# Patient Record
Sex: FEMALE | Race: WHITE | NOT HISPANIC OR LATINO
[De-identification: names, ages, dates, MRNs, and addresses within clinical notes are randomized per-mention and may not be internally consistent; named-entity substitution may affect disease eponyms.]

---

## 2021-01-06 PROBLEM — Z00.00 ENCOUNTER FOR PREVENTIVE HEALTH EXAMINATION: Status: ACTIVE | Noted: 2021-01-06

## 2021-01-07 DIAGNOSIS — C50.912 MALIGNANT NEOPLASM OF UNSPECIFIED SITE OF LEFT FEMALE BREAST: ICD-10-CM

## 2021-01-07 DIAGNOSIS — Z78.9 OTHER SPECIFIED HEALTH STATUS: ICD-10-CM

## 2021-01-07 DIAGNOSIS — N63.0 UNSPECIFIED LUMP IN UNSPECIFIED BREAST: ICD-10-CM

## 2021-01-07 DIAGNOSIS — E78.00 PURE HYPERCHOLESTEROLEMIA, UNSPECIFIED: ICD-10-CM

## 2021-01-07 DIAGNOSIS — I10 ESSENTIAL (PRIMARY) HYPERTENSION: ICD-10-CM

## 2021-01-07 DIAGNOSIS — Z85.41 PERSONAL HISTORY OF MALIGNANT NEOPLASM OF CERVIX UTERI: ICD-10-CM

## 2021-01-07 RX ORDER — IRBESARTAN 300 MG/1
300 TABLET ORAL
Refills: 0 | Status: ACTIVE | COMMUNITY

## 2021-01-07 RX ORDER — METOPROLOL SUCCINATE 100 MG/1
100 TABLET, EXTENDED RELEASE ORAL
Refills: 0 | Status: ACTIVE | COMMUNITY

## 2021-01-07 RX ORDER — AMLODIPINE BESYLATE 5 MG/1
5 TABLET ORAL
Refills: 0 | Status: ACTIVE | COMMUNITY

## 2021-01-07 RX ORDER — ATORVASTATIN CALCIUM 10 MG/1
10 TABLET, FILM COATED ORAL
Refills: 0 | Status: ACTIVE | COMMUNITY

## 2021-01-12 ENCOUNTER — APPOINTMENT (OUTPATIENT)
Dept: BREAST CENTER | Facility: CLINIC | Age: 86
End: 2021-01-12
Payer: MEDICARE

## 2021-01-12 VITALS
DIASTOLIC BLOOD PRESSURE: 73 MMHG | WEIGHT: 160 LBS | HEIGHT: 63 IN | BODY MASS INDEX: 28.35 KG/M2 | SYSTOLIC BLOOD PRESSURE: 182 MMHG

## 2021-01-12 DIAGNOSIS — Z90.12 ACQUIRED ABSENCE OF LEFT BREAST AND NIPPLE: ICD-10-CM

## 2021-01-12 DIAGNOSIS — Z85.3 PERSONAL HISTORY OF MALIGNANT NEOPLASM OF BREAST: ICD-10-CM

## 2021-01-12 PROCEDURE — 99213 OFFICE O/P EST LOW 20 MIN: CPT

## 2021-01-12 PROCEDURE — 99072 ADDL SUPL MATRL&STAF TM PHE: CPT

## 2021-01-12 RX ORDER — ASPIRIN 81 MG/1
81 TABLET ORAL
Refills: 0 | Status: ACTIVE | COMMUNITY
Start: 2021-01-12

## 2021-01-12 NOTE — HISTORY OF PRESENT ILLNESS
[FreeTextEntry1] : The patient is a 89-year-old G3, P3 postmenopausal white female of [place] descent. She underwent menarche at age 12 and had her first child at age 24. She has a history of a previous right breast biopsy in the past which was benign. She has a strong family history with two sisters who had breast cancer at age 40 and age 70. Her father had lung cancer at age 65. The patient felt a mass in the left breast towards the outer central region and underwent the mammography on February 12, 2016 showing a highly suspicious 3.4 cm mass in the outer central region of the left breast corresponding to the palpable density. 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 HER-2 was equivocal by IHC as well as FISH with a ratio of 1.6 but total count of 4.6. She underwent an MRI in March 14, 2016 showing a localized cancer in the left breast measuring 3.3 cm on MRI. She was seen by Dr. Dunn in consultation for possible hormonal therapy but given the pleomorphic subtype with the equivocal HER-2 report the patient was advised on undergoing surgery and underwent a left breast mastectomy and sentinel lymph node biopsy on April 25, 2016 which showed a 4.5 cm infiltrating pleomorphic lobular cancer with free margins and 4 - sentinel lymph nodes on H&E as well as immunohistochemistry. The final pathology showed the tumor to be HER-2/kaye negative by IHC. She did undergo [facility] genetic panel testing in May 2016 which was negative. She was referred back to Dr. Dunn and was started on Arimidex on May 13, 2016. She comes in for yearly follow-up and gets yearly right breast mammography.

## 2021-01-12 NOTE — REASON FOR VISIT
[Follow-Up: _____] : a [unfilled] follow-up visit [Family Member] : family member [FreeTextEntry1] : Patient comes in for routine follow-up with a history of a localized central left breast cancer for which she underwent a left breast total mastectomy without reconstruction and sentinel lymph node biopsy on 2016-04-25 for 4.5 cm pleomorphic lobular cancer with 4 - sentinel lymph nodes which was ER/[place] strongly positive and HER-2/kaye negative.

## 2021-01-12 NOTE — PHYSICAL EXAM
[Normocephalic] : normocephalic [EOMI] : extra ocular movement intact [Supple] : supple [No Supraclavicular Adenopathy] : no supraclavicular adenopathy [No Cervical Adenopathy] : no cervical adenopathy [Normal Sinus Rhythm] : normal sinus rhythm [Examined in the supine and seated position] : examined in the supine and seated position [No dominant masses] : no dominant masses in right breast  [Breast Nipple Inversion Right] : nipple not inverted [Breast Nipple Retraction Right] : nipple not retracted [Breast Nipple Flattening Right] : nipple not flattened [Breast Nipple Fissures Right] : nipple not fissured [Breast Mass Right Breast ___cm] : no masses [No Axillary Lymphadenopathy] : no left axillary lymphadenopathy [Soft] : abdomen soft [Normal Bowel Sounds] : normal bowel sounds  [de-identified] : The patient has the obvious left breast mastectomy. She has a ptotic C-cup breast on the right side. She has no evidence of recurrence over the left chest wall and the right breast is negative. She has no axillary, supraclavicular, or cervical adenopathy. [de-identified] : Status postmastectomy without reconstruction

## 2021-01-12 NOTE — ASSESSMENT
[FreeTextEntry1] : The patient is a 89-year-old G3, P3 postmenopausal white female of [place] descent. She underwent menarche at age 12 and had her first child at age 24. She has a history of a previous right breast biopsy in the past which was benign. She has a strong family history with two sisters who had breast cancer at age 40 and age 70. Her father had lung cancer at age 65. The patient felt a mass in the left breast towards the outer central region and underwent the mammography on February 12, 2016 showing a highly suspicious 3.4 cm mass in the outer central region of the left breast corresponding to the palpable density. She underwent an ultrasound-guided core biopsy on March 3, 2016 showing a pleomorphic infiltrating lobular cancer which was ER/MI strongly positive with a Ki-67 of 16%. HER-2 was equivocal by IHC as well as FISH with a ratio of 1.6 but total count of 4.6. She underwent an MRI in March 14, 2016 showing a localized cancer in the left breast measuring 3.3 cm on MRI. She was seen by Dr. Dunn in consultation for possible hormonal therapy but given the pleomorphic subtype with the equivocal HER-2 report the patient was advised on undergoing surgery and underwent a left breast mastectomy and sentinel lymph node biopsy on April 25, 2016 which showed a 4.5 cm infiltrating pleomorphic lobular cancer with free margins and 4 - sentinel lymph nodes on H&E as well as immunohistochemistry. The final pathology showed the tumor to be HER-2/kaye negative by IHC. She did undergo [facility] genetic panel testing in May 2016 which was negative. She was referred back to Dr. Dunn and was started on Arimidex on May 13, 2016. On exam today she has no evidence of recurrence along the left chest wall and her right breast is negative. She underwent a recent right breast mammography on July 24, 2020 which showed no suspicious findings. The patient was reassured and can just follow-up on a yearly basis. She was given a prescription for another right breast mammography but I think she can stop routine mammography at this age.

## 2021-06-03 RX ORDER — EXEMESTANE 25 MG/1
25 TABLET, FILM COATED ORAL
Qty: 90 | Refills: 3 | Status: ACTIVE | COMMUNITY
Start: 2021-06-03 | End: 1900-01-01

## 2023-03-24 ENCOUNTER — RX RENEWAL (OUTPATIENT)
Age: 88
End: 2023-03-24

## 2023-03-24 RX ORDER — EXEMESTANE 25 MG/1
25 TABLET, FILM COATED ORAL
Qty: 30 | Refills: 0 | Status: ACTIVE | COMMUNITY
Start: 2023-03-24 | End: 1900-01-01

## 2023-03-27 ENCOUNTER — RX RENEWAL (OUTPATIENT)
Age: 88
End: 2023-03-27

## 2023-04-03 ENCOUNTER — NON-APPOINTMENT (OUTPATIENT)
Age: 88
End: 2023-04-03

## 2023-04-03 NOTE — ASSESSMENT
[FreeTextEntry1] : The patient is a 89-year-old G3, P3 postmenopausal white female of [place] descent. She underwent menarche at age 12 and had her first child at age 24. She has a history of a previous right breast biopsy in the past which was benign. She has a strong family history with two sisters who had breast cancer at age 40 and age 70. Her father had lung cancer at age 65. The patient felt a mass in the left breast towards the outer central region and underwent the mammography on February 12, 2016 showing a highly suspicious 3.4 cm mass in the outer central region of the left breast corresponding to the palpable density. She underwent an ultrasound-guided core biopsy on March 3, 2016 showing a pleomorphic infiltrating lobular cancer which was ER/MS strongly positive with a Ki-67 of 16%. HER-2 was equivocal by IHC as well as FISH with a ratio of 1.6 but total count of 4.6. She underwent an MRI in March 14, 2016 showing a localized cancer in the left breast measuring 3.3 cm on MRI. She was seen by Dr. Dunn in consultation for possible hormonal therapy but given the pleomorphic subtype with the equivocal HER-2 report the patient was advised on undergoing surgery and underwent a left breast mastectomy and sentinel lymph node biopsy on April 25, 2016 which showed a 4.5 cm infiltrating pleomorphic lobular cancer with free margins and 4 negative sentinel lymph nodes on H&E as well as immunohistochemistry. The final pathology showed the tumor to be HER-2/kaye negative by IHC. She did undergo [facility] genetic panel testing in May 2016 which was negative. She was referred back to Dr. Dunn and was started on Arimidex on May 13, 2016. On exam today she has no evidence of recurrence along the left chest wall and her right breast is negative. She underwent her last right breast mammography on ??????? July 24, 2020 which showed no suspicious findings. The patient was reassured and can just follow-up on a yearly basis. She was given a prescription for another right breast mammography ???????? but I think she can stop routine mammography at this age.  She remains on Arimidex and now follows up with ?????????.

## 2023-04-03 NOTE — HISTORY OF PRESENT ILLNESS
[FreeTextEntry1] : The patient is a 89-year-old G3, P3 postmenopausal white female of Bengali descent. She underwent menarche at age 12 and had her first child at age 24. She has a history of a previous right breast biopsy in the past which was benign. She has a strong family history with two sisters who had breast cancer at age 40 and age 70. Her father had lung cancer at age 65. The patient felt a mass in the left breast towards the outer central region and underwent the mammography on February 12, 2016 showing a highly suspicious 3.4 cm mass in the outer central region of the left breast corresponding to the palpable density. She underwent an ultrasound-guided core biopsy on March 3, 2016 showing a pleomorphic infiltrating lobular cancer which was ER/NE strongly positive with a Ki-67 of 16%. HER-2 was equivocal by IHC as well as FISH with a ratio of 1.6 but total count of 4.6. She underwent an MRI in March 14, 2016 showing a localized cancer in the left breast measuring 3.3 cm on MRI. She was seen by Dr. Dunn in consultation for possible hormonal therapy but given the pleomorphic subtype with the equivocal HER-2 report the patient was advised on undergoing surgery and underwent a left breast mastectomy and sentinel lymph node biopsy on April 25, 2016 which showed a 4.5 cm infiltrating pleomorphic lobular cancer with free margins and 4 - sentinel lymph nodes on H&E as well as immunohistochemistry. The final pathology showed the tumor to be HER-2/kaye negative by IHC. She did undergo [facility] genetic panel testing in May 2016 which was negative. She was referred back to Dr. Dunn and was started on Arimidex on May 13, 2016. She comes in for yearly follow-up and gets yearly right breast mammography.

## 2023-04-03 NOTE — REASON FOR VISIT
[Follow-Up: _____] : a [unfilled] follow-up visit [Family Member] : family member [FreeTextEntry1] : Patient comes in for routine follow-up with a history of a localized central left breast cancer for which she underwent a left breast total mastectomy without reconstruction and sentinel lymph node biopsy on 2016-04-25 for 4.5 cm pleomorphic lobular cancer with 4 - sentinel lymph nodes which was ER/KS strongly positive and HER-2/kaye negative.

## 2023-04-03 NOTE — PHYSICAL EXAM
[Normocephalic] : normocephalic [EOMI] : extra ocular movement intact [Supple] : supple [No Supraclavicular Adenopathy] : no supraclavicular adenopathy [No Cervical Adenopathy] : no cervical adenopathy [Normal Sinus Rhythm] : normal sinus rhythm [Examined in the supine and seated position] : examined in the supine and seated position [No dominant masses] : no dominant masses in right breast  [Breast Mass Right Breast ___cm] : no masses [No Axillary Lymphadenopathy] : no left axillary lymphadenopathy [Soft] : abdomen soft [Normal Bowel Sounds] : normal bowel sounds  [Breast Nipple Inversion Right] : nipple not inverted [Breast Nipple Retraction Right] : nipple not retracted [Breast Nipple Flattening Right] : nipple not flattened [Breast Nipple Fissures Right] : nipple not fissured [de-identified] : The patient has the obvious left breast mastectomy. She has a ptotic C-cup breast on the right side. She has no evidence of recurrence over the left chest wall and the right breast is negative. She has no axillary, supraclavicular, or cervical adenopathy. [de-identified] : Status postmastectomy without reconstruction

## 2023-04-11 ENCOUNTER — APPOINTMENT (OUTPATIENT)
Dept: BREAST CENTER | Facility: CLINIC | Age: 88
End: 2023-04-11
Payer: MEDICARE

## 2023-04-11 VITALS — HEIGHT: 63 IN | BODY MASS INDEX: 28.35 KG/M2 | WEIGHT: 160 LBS

## 2023-04-11 DIAGNOSIS — Z85.3 PERSONAL HISTORY OF MALIGNANT NEOPLASM OF BREAST: ICD-10-CM

## 2023-04-11 DIAGNOSIS — Z80.3 FAMILY HISTORY OF MALIGNANT NEOPLASM OF BREAST: ICD-10-CM

## 2023-04-11 DIAGNOSIS — Z90.12 ACQUIRED ABSENCE OF LEFT BREAST AND NIPPLE: ICD-10-CM

## 2023-04-11 PROCEDURE — 99213 OFFICE O/P EST LOW 20 MIN: CPT

## 2023-04-11 RX ORDER — ANASTROZOLE 1 MG/1
1 TABLET ORAL DAILY
Refills: 0 | Status: DISCONTINUED | COMMUNITY
End: 2023-04-11

## 2023-04-11 RX ORDER — EXEMESTANE 25 MG/1
25 TABLET, FILM COATED ORAL DAILY
Qty: 90 | Refills: 0 | Status: ACTIVE | COMMUNITY
Start: 2023-04-11 | End: 1900-01-01

## 2023-04-11 NOTE — ASSESSMENT
[FreeTextEntry1] : The patient is a 89-year-old G3, P3 postmenopausal white female of [place] descent. She underwent menarche at age 12 and had her first child at age 24. She has a history of a previous right breast biopsy in the past which was benign. She has a strong family history with two sisters who had breast cancer at age 40 and age 70. Her father had lung cancer at age 65. The patient felt a mass in the left breast towards the outer central region and underwent the mammography on February 12, 2016 showing a highly suspicious 3.4 cm mass in the outer central region of the left breast corresponding to the palpable density. She underwent an ultrasound-guided core biopsy on March 3, 2016 showing a pleomorphic infiltrating lobular cancer which was ER/RI strongly positive with a Ki-67 of 16%. HER-2 was equivocal by IHC as well as FISH with a ratio of 1.6 but total count of 4.6. She underwent an MRI in March 14, 2016 showing a localized cancer in the left breast measuring 3.3 cm on MRI. She was seen by Dr. Dunn in consultation for possible hormonal therapy but given the pleomorphic subtype with the equivocal HER-2 report the patient was advised on undergoing surgery and underwent a left breast mastectomy and sentinel lymph node biopsy on April 25, 2016 which showed a 4.5 cm infiltrating pleomorphic lobular cancer with free margins and 4 negative sentinel lymph nodes on H&E as well as immunohistochemistry. The final pathology showed the tumor to be HER-2/kaye negative by IHC. She did undergo [facility] genetic panel testing in May 2016 which was negative. She was referred back to Dr. Dunn and was started on Arimidex on May 13, 2016 but is now on Aromasin. On exam today she has no evidence of recurrence along the left chest wall and her right breast is negative. She underwent her last right breast mammography on July 24, 2020 which showed no suspicious findings. The patient was reassured and can just follow-up on a yearly basis.  She has stopped mammography given her age.  She remains on Aromasin and we sent in another prescription for her today.  She was also given a prescription for postmastectomy bra.

## 2023-04-11 NOTE — HISTORY OF PRESENT ILLNESS
[FreeTextEntry1] : The patient is a 89-year-old G3, P3 postmenopausal white female of [place] descent. She underwent menarche at age 12 and had her first child at age 24. She has a history of a previous right breast biopsy in the past which was benign. She has a strong family history with two sisters who had breast cancer at age 40 and age 70. Her father had lung cancer at age 65. The patient felt a mass in the left breast towards the outer central region and underwent the mammography on February 12, 2016 showing a highly suspicious 3.4 cm mass in the outer central region of the left breast corresponding to the palpable density. She underwent an ultrasound-guided core biopsy on March 3, 2016 showing a pleomorphic infiltrating lobular cancer which was ER/UT strongly positive with a Ki-67 of 16%. HER-2 was equivocal by IHC as well as FISH with a ratio of 1.6 but total count of 4.6. She underwent an MRI in March 14, 2016 showing a localized cancer in the left breast measuring 3.3 cm on MRI. She was seen by Dr. Dunn in consultation for possible hormonal therapy but given the pleomorphic subtype with the equivocal HER-2 report the patient was advised on undergoing surgery and underwent a left breast mastectomy and sentinel lymph node biopsy on April 25, 2016 which showed a 4.5 cm infiltrating pleomorphic lobular cancer with free margins and 4 - sentinel lymph nodes on H&E as well as immunohistochemistry. The final pathology showed the tumor to be HER-2/kaye negative by IHC. She did undergo [facility] genetic panel testing in May 2016 which was negative. She was referred back to Dr. Dunn and was started on Arimidex on May 13, 2016 but later switched to Aromasin. She comes in for yearly follow-up and has stopped mammography given her age.

## 2023-04-11 NOTE — REASON FOR VISIT
[Follow-Up: _____] : a [unfilled] follow-up visit [Family Member] : family member [FreeTextEntry1] : Patient comes in for routine follow-up with a history of a localized central left breast cancer for which she underwent a left breast total mastectomy without reconstruction and sentinel lymph node biopsy on 2016-04-25 for 4.5 cm pleomorphic lobular cancer with 4 - sentinel lymph nodes which was ER/DC strongly positive and HER-2/kaye negative.

## 2023-04-11 NOTE — PHYSICAL EXAM
[Normocephalic] : normocephalic [EOMI] : extra ocular movement intact [Supple] : supple [No Supraclavicular Adenopathy] : no supraclavicular adenopathy [No Cervical Adenopathy] : no cervical adenopathy [Normal Sinus Rhythm] : normal sinus rhythm [Examined in the supine and seated position] : examined in the supine and seated position [No dominant masses] : no dominant masses in right breast  [Breast Mass Right Breast ___cm] : no masses [No Axillary Lymphadenopathy] : no left axillary lymphadenopathy [Soft] : abdomen soft [Normal Bowel Sounds] : normal bowel sounds  [Breast Nipple Inversion Right] : nipple not inverted [Breast Nipple Retraction Right] : nipple not retracted [Breast Nipple Flattening Right] : nipple not flattened [Breast Nipple Fissures Right] : nipple not fissured [de-identified] : The patient has the obvious left breast mastectomy. She has a ptotic C-cup breast on the right side. She has no evidence of recurrence over the left chest wall and the right breast is negative. She has no axillary, supraclavicular, or cervical adenopathy. [de-identified] : Status postmastectomy without reconstruction

## 2023-08-21 RX ORDER — EXEMESTANE 25 MG/1
25 TABLET, FILM COATED ORAL DAILY
Qty: 90 | Refills: 3 | Status: ACTIVE | COMMUNITY
Start: 2023-08-21 | End: 1900-01-01

## 2024-09-03 ENCOUNTER — RX RENEWAL (OUTPATIENT)
Age: 89
End: 2024-09-03

## 2024-09-03 RX ORDER — EXEMESTANE 25 MG/1
25 TABLET, FILM COATED ORAL DAILY
Qty: 90 | Refills: 0 | Status: ACTIVE | COMMUNITY
Start: 2024-09-03 | End: 1900-01-01

## 2024-09-05 ENCOUNTER — NON-APPOINTMENT (OUTPATIENT)
Age: 89
End: 2024-09-05

## 2024-09-05 NOTE — ASSESSMENT
[FreeTextEntry1] : The patient is a 89-year-old G3, P3 postmenopausal white female of [place] descent. She underwent menarche at age 12 and had her first child at age 24. She has a history of a previous right breast biopsy in the past which was benign. She has a strong family history with two sisters who had breast cancer at age 40 and age 70. Her father had lung cancer at age 65. The patient felt a mass in the left breast towards the outer central region and underwent the mammography on February 12, 2016 showing a highly suspicious 3.4 cm mass in the outer central region of the left breast corresponding to the palpable density. She underwent an ultrasound-guided core biopsy on March 3, 2016 showing a pleomorphic infiltrating lobular cancer which was ER/[place] strongly positive with a Ki-67 of 16%. HER-2 was equivocal by IHC as well as FISH with a ratio of 1.6 but total count of 4.6. She underwent an MRI in March 14, 2016 showing a localized cancer in the left breast measuring 3.3 cm on MRI. She was seen by Dr. Dunn in consultation for possible hormonal therapy but given the pleomorphic subtype with the equivocal HER-2 report the patient was advised on undergoing surgery and underwent a left breast mastectomy and sentinel lymph node biopsy on April 25, 2016 which showed a 4.5 cm infiltrating pleomorphic lobular cancer with free margins and 4 negative sentinel lymph nodes on H&E as well as immunohistochemistry. The final pathology showed the tumor to be HER-2/kaye negative by IHC.  This was a pathologic prognostic stage IB breast cancer (high-grade T2 N0 M0).  She did undergo [facility] genetic panel testing in May 2016 which was negative. She was referred back to Dr. Dunn and was started on Arimidex on May 13, 2016 but is now on Aromasin. On exam today, she has no evidence of recurrence along the left chest wall and her right breast is negative. She underwent her last right breast mammography on July 24, 2020 which showed no suspicious findings. The patient was reassured and can just follow-up on a yearly basis.  She has stopped mammography given her age.  She remains on Aromasin ?????? and we sent in another prescription for her today.  She was also given a prescription for postmastectomy bra.

## 2024-09-05 NOTE — HISTORY OF PRESENT ILLNESS
[FreeTextEntry1] : The patient is a 89-year-old G3, P3 postmenopausal white female of [place] descent. She underwent menarche at age 12 and had her first child at age 24. She has a history of a previous right breast biopsy in the past which was benign. She has a strong family history with two sisters who had breast cancer at age 40 and age 70. Her father had lung cancer at age 65. The patient felt a mass in the left breast towards the outer central region and underwent the mammography on February 12, 2016 showing a highly suspicious 3.4 cm mass in the outer central region of the left breast corresponding to the palpable density. She underwent an ultrasound-guided core biopsy on March 3, 2016 showing a pleomorphic infiltrating lobular cancer which was ER/ND strongly positive with a Ki-67 of 16%. HER-2 was equivocal by IHC as well as FISH with a ratio of 1.6 but total count of 4.6. She underwent an MRI on March 14, 2016 showing a localized cancer in the left breast measuring 3.3 cm on MRI. She was seen by Dr. Dunn in consultation for possible hormonal therapy but given the pleomorphic subtype with the equivocal HER-2 report the patient was advised on undergoing surgery and underwent a left breast mastectomy and sentinel lymph node biopsy on April 25, 2016 which showed a 4.5 cm infiltrating pleomorphic lobular cancer with free margins and 4 - sentinel lymph nodes on H&E as well as immunohistochemistry. The final pathology showed the tumor to be HER-2/kaye negative by IHC. She did undergo [facility] genetic panel testing in May 2016 which was negative. She was referred back to Dr. Dunn and was started on Arimidex on May 13, 2016 but later switched to Aromasin. She comes in for yearly follow-up and has stopped mammography given her age.

## 2024-09-05 NOTE — REASON FOR VISIT
[Follow-Up: _____] : a [unfilled] follow-up visit [Family Member] : family member [FreeTextEntry1] : Patient comes in for routine follow-up with a history of a localized central left breast cancer for which she underwent a left breast total mastectomy without reconstruction and sentinel lymph node biopsy on 2016-04-25 for 4.5 cm pleomorphic lobular cancer with 4 - sentinel lymph nodes which was ER/CT strongly positive and HER-2/kaye negative.

## 2024-09-05 NOTE — PHYSICAL EXAM
[Normocephalic] : normocephalic [EOMI] : extra ocular movement intact [Supple] : supple [No Supraclavicular Adenopathy] : no supraclavicular adenopathy [No Cervical Adenopathy] : no cervical adenopathy [Normal Sinus Rhythm] : normal sinus rhythm [Examined in the supine and seated position] : examined in the supine and seated position [No dominant masses] : no dominant masses in right breast  [Breast Nipple Inversion Right] : nipple not inverted [Breast Nipple Retraction Right] : nipple not retracted [Breast Nipple Flattening Right] : nipple not flattened [Breast Nipple Fissures Right] : nipple not fissured [Breast Mass Right Breast ___cm] : no masses [No Axillary Lymphadenopathy] : no left axillary lymphadenopathy [Soft] : abdomen soft [Normal Bowel Sounds] : normal bowel sounds  [No Rashes] : no rashes [No Ulceration] : no ulceration [de-identified] : Status postmastectomy without reconstruction [de-identified] : The patient has the obvious left breast mastectomy. She has a ptotic C-cup breast on the right side. She has no evidence of recurrence over the left chest wall and the right breast is negative. She has no axillary, supraclavicular, or cervical adenopathy.

## 2024-09-24 ENCOUNTER — APPOINTMENT (OUTPATIENT)
Dept: BREAST CENTER | Facility: CLINIC | Age: 89
End: 2024-09-24
Payer: MEDICARE

## 2024-09-24 VITALS
BODY MASS INDEX: 28.35 KG/M2 | HEIGHT: 63 IN | HEART RATE: 73 BPM | WEIGHT: 160 LBS | OXYGEN SATURATION: 98 % | SYSTOLIC BLOOD PRESSURE: 152 MMHG | DIASTOLIC BLOOD PRESSURE: 65 MMHG

## 2024-09-24 DIAGNOSIS — Z90.12 ACQUIRED ABSENCE OF LEFT BREAST AND NIPPLE: ICD-10-CM

## 2024-09-24 DIAGNOSIS — Z12.39 ENCOUNTER FOR OTHER SCREENING FOR MALIGNANT NEOPLASM OF BREAST: ICD-10-CM

## 2024-09-24 DIAGNOSIS — Z80.3 FAMILY HISTORY OF MALIGNANT NEOPLASM OF BREAST: ICD-10-CM

## 2024-09-24 DIAGNOSIS — Z85.3 PERSONAL HISTORY OF MALIGNANT NEOPLASM OF BREAST: ICD-10-CM

## 2024-09-24 PROCEDURE — 99213 OFFICE O/P EST LOW 20 MIN: CPT

## 2024-09-24 NOTE — PHYSICAL EXAM
[Normocephalic] : normocephalic [EOMI] : extra ocular movement intact [Supple] : supple [No Supraclavicular Adenopathy] : no supraclavicular adenopathy [No Cervical Adenopathy] : no cervical adenopathy [Normal Sinus Rhythm] : normal sinus rhythm [Examined in the supine and seated position] : examined in the supine and seated position [No dominant masses] : no dominant masses in right breast  [Breast Mass Right Breast ___cm] : no masses [No Axillary Lymphadenopathy] : no left axillary lymphadenopathy [Soft] : abdomen soft [Normal Bowel Sounds] : normal bowel sounds  [No Rashes] : no rashes [No Ulceration] : no ulceration [Breast Nipple Inversion Right] : nipple not inverted [Breast Nipple Retraction Right] : nipple not retracted [Breast Nipple Flattening Right] : nipple not flattened [Breast Nipple Fissures Right] : nipple not fissured [de-identified] : The patient has the obvious left breast mastectomy. She has a ptotic C-cup breast on the right side. She has no evidence of recurrence over the left chest wall and the right breast is negative. She has no axillary, supraclavicular, or cervical adenopathy. [de-identified] : Status postmastectomy without reconstruction

## 2024-09-24 NOTE — HISTORY OF PRESENT ILLNESS
[FreeTextEntry1] : The patient is a 89-year-old G3, P3 postmenopausal white female of [place] descent. She underwent menarche at age 12 and had her first child at age 24. She has a history of a previous right breast biopsy in the past which was benign. She has a strong family history with two sisters who had breast cancer at age 40 and age 70. Her father had lung cancer at age 65. The patient felt a mass in the left breast towards the outer central region and underwent the mammography on February 12, 2016 showing a highly suspicious 3.4 cm mass in the outer central region of the left breast corresponding to the palpable density. 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 HER-2 was equivocal by IHC as well as FISH with a ratio of 1.6 but total count of 4.6. She underwent an MRI on March 14, 2016 showing a localized cancer in the left breast measuring 3.3 cm on MRI. She was seen by Dr. Dunn in consultation for possible hormonal therapy but given the pleomorphic subtype with the equivocal HER-2 report the patient was advised on undergoing surgery and underwent a left breast mastectomy and sentinel lymph node biopsy on April 25, 2016 which showed a 4.5 cm infiltrating pleomorphic lobular cancer with free margins and 4 - sentinel lymph nodes on H&E as well as immunohistochemistry. The final pathology showed the tumor to be HER-2/kaye negative by IHC. She did undergo [facility] genetic panel testing in May 2016 which was negative. She was referred back to Dr. Dunn and was started on Arimidex on May 13, 2016 but later switched to Aromasin. She comes in for yearly follow-up and has stopped mammography given her age.

## 2024-09-24 NOTE — ADDENDUM
[FreeTextEntry1] : I spent greater than 75% of the consultation in face-to-face counseling and coordination of care in this patient with a history of a left breast cancer who underwent a mastectomy and comes in now for routine breast cancer screening/surveillance.

## 2024-09-24 NOTE — PHYSICAL EXAM
[Normocephalic] : normocephalic [EOMI] : extra ocular movement intact [Supple] : supple [No Supraclavicular Adenopathy] : no supraclavicular adenopathy [No Cervical Adenopathy] : no cervical adenopathy [Normal Sinus Rhythm] : normal sinus rhythm [Examined in the supine and seated position] : examined in the supine and seated position [No dominant masses] : no dominant masses in right breast  [Breast Mass Right Breast ___cm] : no masses [No Axillary Lymphadenopathy] : no left axillary lymphadenopathy [Soft] : abdomen soft [Normal Bowel Sounds] : normal bowel sounds  [No Rashes] : no rashes [No Ulceration] : no ulceration [Breast Nipple Inversion Right] : nipple not inverted [Breast Nipple Retraction Right] : nipple not retracted [Breast Nipple Flattening Right] : nipple not flattened [Breast Nipple Fissures Right] : nipple not fissured [de-identified] : The patient has the obvious left breast mastectomy. She has a ptotic C-cup breast on the right side. She has no evidence of recurrence over the left chest wall and the right breast is negative. She has no axillary, supraclavicular, or cervical adenopathy. [de-identified] : Status postmastectomy without reconstruction

## 2024-09-24 NOTE — HISTORY OF PRESENT ILLNESS
[FreeTextEntry1] : The patient is a 89-year-old G3, P3 postmenopausal white female of [place] descent. She underwent menarche at age 12 and had her first child at age 24. She has a history of a previous right breast biopsy in the past which was benign. She has a strong family history with two sisters who had breast cancer at age 40 and age 70. Her father had lung cancer at age 65. The patient felt a mass in the left breast towards the outer central region and underwent the mammography on February 12, 2016 showing a highly suspicious 3.4 cm mass in the outer central region of the left breast corresponding to the palpable density. She underwent an ultrasound-guided core biopsy on March 3, 2016 showing a pleomorphic infiltrating lobular cancer which was ER/FL strongly positive with a Ki-67 of 16%. HER-2 was equivocal by IHC as well as FISH with a ratio of 1.6 but total count of 4.6. She underwent an MRI on March 14, 2016 showing a localized cancer in the left breast measuring 3.3 cm on MRI. She was seen by Dr. Dunn in consultation for possible hormonal therapy but given the pleomorphic subtype with the equivocal HER-2 report the patient was advised on undergoing surgery and underwent a left breast mastectomy and sentinel lymph node biopsy on April 25, 2016 which showed a 4.5 cm infiltrating pleomorphic lobular cancer with free margins and 4 - sentinel lymph nodes on H&E as well as immunohistochemistry. The final pathology showed the tumor to be HER-2/kaye negative by IHC. She did undergo [facility] genetic panel testing in May 2016 which was negative. She was referred back to Dr. Dunn and was started on Arimidex on May 13, 2016 but later switched to Aromasin. She comes in for yearly follow-up and has stopped mammography given her age.

## 2024-09-24 NOTE — ASSESSMENT
[FreeTextEntry1] : The patient is a 89-year-old G3, P3 postmenopausal white female of [place] descent. She underwent menarche at age 12 and had her first child at age 24. She has a history of a previous right breast biopsy in the past which was benign. She has a strong family history with two sisters who had breast cancer at age 40 and age 70. Her father had lung cancer at age 65. The patient felt a mass in the left breast towards the outer central region and underwent the mammography on February 12, 2016 showing a highly suspicious 3.4 cm mass in the outer central region of the left breast corresponding to the palpable density. She underwent an ultrasound-guided core biopsy on March 3, 2016 showing a pleomorphic infiltrating lobular cancer which was ER/KS strongly positive with a Ki-67 of 16%. HER-2 was equivocal by IHC as well as FISH with a ratio of 1.6 but total count of 4.6. She underwent an MRI in March 14, 2016 showing a localized cancer in the left breast measuring 3.3 cm on MRI. She was seen by Dr. Dunn in consultation for possible hormonal therapy but given the pleomorphic subtype with the equivocal HER-2 report the patient was advised on undergoing surgery and underwent a left breast mastectomy and sentinel lymph node biopsy on April 25, 2016 which showed a 4.5 cm infiltrating pleomorphic lobular cancer with free margins and 4 negative sentinel lymph nodes on H&E as well as immunohistochemistry. The final pathology showed the tumor to be HER-2/kaye negative by IHC.  This was a pathologic prognostic stage IB breast cancer (high-grade T2 N0 M0).  She did undergo [facility] genetic panel testing in May 2016 which was negative. She was referred back to Dr. Dunn and was started on Arimidex on May 13, 2016 but is now on Aromasin. On exam today, she has no evidence of recurrence along the left chest wall and her right breast is negative. She underwent her last right breast mammography on July 24, 2020 which showed no suspicious findings. The patient was reassured and can just follow-up on an as needed basis.  She has stopped mammography given her age.  She remains on Aromasin but I told her that she could come off of it and see if she notices any improvement in her mobility.  If not, she can be maintained on it.  I told her we could keep writing her scripts even though she does not need to come in anymore.  She can have her primary care just to routine breast exams which will be adequate in her case.

## 2024-09-24 NOTE — REASON FOR VISIT
[Follow-Up: _____] : a [unfilled] follow-up visit [Family Member] : family member [FreeTextEntry1] : Patient comes in for routine follow-up with a history of a localized central left breast cancer for which she underwent a left breast total mastectomy without reconstruction and sentinel lymph node biopsy on 2016-04-25 for 4.5 cm pleomorphic lobular cancer with 4 - sentinel lymph nodes which was ER/NE strongly positive and HER-2/kaye negative.

## 2024-09-24 NOTE — ASSESSMENT
[FreeTextEntry1] : The patient is a 89-year-old G3, P3 postmenopausal white female of [place] descent. She underwent menarche at age 12 and had her first child at age 24. She has a history of a previous right breast biopsy in the past which was benign. She has a strong family history with two sisters who had breast cancer at age 40 and age 70. Her father had lung cancer at age 65. The patient felt a mass in the left breast towards the outer central region and underwent the mammography on February 12, 2016 showing a highly suspicious 3.4 cm mass in the outer central region of the left breast corresponding to the palpable density. She underwent an ultrasound-guided core biopsy on March 3, 2016 showing a pleomorphic infiltrating lobular cancer which was ER/IA strongly positive with a Ki-67 of 16%. HER-2 was equivocal by IHC as well as FISH with a ratio of 1.6 but total count of 4.6. She underwent an MRI in March 14, 2016 showing a localized cancer in the left breast measuring 3.3 cm on MRI. She was seen by Dr. Dunn in consultation for possible hormonal therapy but given the pleomorphic subtype with the equivocal HER-2 report the patient was advised on undergoing surgery and underwent a left breast mastectomy and sentinel lymph node biopsy on April 25, 2016 which showed a 4.5 cm infiltrating pleomorphic lobular cancer with free margins and 4 negative sentinel lymph nodes on H&E as well as immunohistochemistry. The final pathology showed the tumor to be HER-2/kaye negative by IHC.  This was a pathologic prognostic stage IB breast cancer (high-grade T2 N0 M0).  She did undergo [facility] genetic panel testing in May 2016 which was negative. She was referred back to Dr. Dunn and was started on Arimidex on May 13, 2016 but is now on Aromasin. On exam today, she has no evidence of recurrence along the left chest wall and her right breast is negative. She underwent her last right breast mammography on July 24, 2020 which showed no suspicious findings. The patient was reassured and can just follow-up on an as needed basis.  She has stopped mammography given her age.  She remains on Aromasin but I told her that she could come off of it and see if she notices any improvement in her mobility.  If not, she can be maintained on it.  I told her we could keep writing her scripts even though she does not need to come in anymore.  She can have her primary care just to routine breast exams which will be adequate in her case.

## 2024-09-24 NOTE — REASON FOR VISIT
[Follow-Up: _____] : a [unfilled] follow-up visit [Family Member] : family member [FreeTextEntry1] : Patient comes in for routine follow-up with a history of a localized central left breast cancer for which she underwent a left breast total mastectomy without reconstruction and sentinel lymph node biopsy on 2016-04-25 for 4.5 cm pleomorphic lobular cancer with 4 - sentinel lymph nodes which was ER/IL strongly positive and HER-2/kaye negative.